# Patient Record
Sex: MALE | Race: WHITE | HISPANIC OR LATINO | Employment: OTHER | URBAN - METROPOLITAN AREA
[De-identification: names, ages, dates, MRNs, and addresses within clinical notes are randomized per-mention and may not be internally consistent; named-entity substitution may affect disease eponyms.]

---

## 2017-03-02 ENCOUNTER — TELEPHONE (OUTPATIENT)
Dept: CARDIOLOGY | Facility: CLINIC | Age: 77
End: 2017-03-02

## 2017-03-03 DIAGNOSIS — R00.2 PALPITATIONS: Primary | ICD-10-CM

## 2017-03-07 ENCOUNTER — TELEPHONE (OUTPATIENT)
Dept: CARDIOLOGY | Facility: CLINIC | Age: 77
End: 2017-03-07

## 2017-03-07 NOTE — TELEPHONE ENCOUNTER
Patient called asking when he was supposed to get holter monitor. Informed patient he could come in on Thursday this week after 11am or anytime Friday. Patient verbalized understanding.

## 2017-03-09 ENCOUNTER — OFFICE VISIT (OUTPATIENT)
Dept: CARDIOLOGY | Facility: CLINIC | Age: 77
End: 2017-03-09

## 2017-03-09 DIAGNOSIS — R00.2 PALPITATIONS: ICD-10-CM

## 2017-03-09 PROCEDURE — 93225 XTRNL ECG REC<48 HRS REC: CPT | Performed by: INTERNAL MEDICINE

## 2017-03-17 ENCOUNTER — OFFICE VISIT (OUTPATIENT)
Dept: CARDIOLOGY | Facility: CLINIC | Age: 77
End: 2017-03-17

## 2017-03-17 DIAGNOSIS — I48.0 PAROXYSMAL ATRIAL FIBRILLATION (HCC): ICD-10-CM

## 2017-03-17 PROCEDURE — 93227 XTRNL ECG REC<48 HR R&I: CPT | Performed by: INTERNAL MEDICINE

## 2017-03-21 ENCOUNTER — TELEPHONE (OUTPATIENT)
Dept: CARDIOLOGY | Facility: CLINIC | Age: 77
End: 2017-03-21

## 2017-06-02 RX ORDER — FLECAINIDE ACETATE 50 MG/1
TABLET ORAL
Qty: 60 TABLET | Refills: 1 | Status: SHIPPED | OUTPATIENT
Start: 2017-06-02 | End: 2017-11-28 | Stop reason: SDUPTHER

## 2017-09-01 ENCOUNTER — OFFICE VISIT (OUTPATIENT)
Dept: CARDIOLOGY | Facility: CLINIC | Age: 77
End: 2017-09-01

## 2017-09-01 VITALS
WEIGHT: 171.2 LBS | HEART RATE: 56 BPM | HEIGHT: 69 IN | BODY MASS INDEX: 25.36 KG/M2 | DIASTOLIC BLOOD PRESSURE: 82 MMHG | SYSTOLIC BLOOD PRESSURE: 124 MMHG

## 2017-09-01 DIAGNOSIS — E78.2 MIXED HYPERLIPIDEMIA: ICD-10-CM

## 2017-09-01 DIAGNOSIS — R00.2 PALPITATIONS: Primary | ICD-10-CM

## 2017-09-01 PROCEDURE — 99213 OFFICE O/P EST LOW 20 MIN: CPT | Performed by: INTERNAL MEDICINE

## 2017-09-01 RX ORDER — LANOLIN ALCOHOL/MO/W.PET/CERES
1000 CREAM (GRAM) TOPICAL DAILY
COMMUNITY

## 2017-09-01 NOTE — PROGRESS NOTES
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    Chief Complaint   Patient presents with   • Atrial Fibrillation     follow up       PROBLEM LIST:   1.  Paroxysmal atrial fibrillation with no recent evaluation:  a. 9/ 2011, Holter with 600 PACs, with 20 runs, the longest 16 beats.  Minimum heart rate of 41, maximum of 114.  b. 3/17 holter 1400 pac, nsat 11 beats  2.  Plus/minus hypertension.  3. Dyslipidemia:  a. November 2010:  Total  cholesterol 110, triglycerides 50, HDL 35, and LDL 65.  4. Glaucoma.    Allergies  Allergies   Allergen Reactions   • Sulfa Antibiotics        Current Medications    Current Outpatient Prescriptions:   •  aspirin 81 MG EC tablet, Take 81 mg by mouth daily., Disp: , Rfl:   •  diclofenac (VOLTAREN) 75 MG EC tablet, 75 mg as needed., Disp: , Rfl:   •  dorzolamide-timolol (COSOPT) 22.3-6.8 MG/ML ophthalmic solution, Administer 1 drop to both eyes., Disp: , Rfl:   •  flecainide (TAMBOCOR) 50 MG tablet, TAKE ONE TABLET BY MOUTH TWICE A DAY, Disp: 60 tablet, Rfl: 1  •  folic acid (FOLVITE) 1 MG tablet, 1 mg daily., Disp: , Rfl:   •  levothyroxine (SYNTHROID, LEVOTHROID) 50 MCG tablet, 50 mcg daily., Disp: , Rfl:   •  omega-3 acid ethyl esters (LOVAZA) 1 G capsule, Take 2 g by mouth 2 (two) times a day., Disp: , Rfl:   •  vitamin B-12 (CYANOCOBALAMIN) 1000 MCG tablet, Take 1,000 mcg by mouth Daily., Disp: , Rfl:     History of Present Illness     Pt   ROS:  All systems have been reviewed and are negative with the exception of those mentioned in the HPI.    OBJECTIVE:  Vitals:    09/01/17 0842   BP: 124/82   BP Location: Left arm   Patient Position: Sitting   Pulse: 56   Weight: 171 lb 3.2 oz (77.7 kg)   Height: 69\" (175.3 cm)     Physical Exam   Constitutional: He appears well-developed and well-nourished.   Neck: " Normal range of motion. Neck supple. No hepatojugular reflux and no JVD present. Carotid bruit is not present. No tracheal deviation present. No thyromegaly present.   Cardiovascular: Normal rate, regular rhythm, S1 normal, S2 normal, intact distal pulses and normal pulses.  PMI is not displaced.  Exam reveals no gallop, no distant heart sounds, no friction rub, no midsystolic click and no opening snap.    No murmur heard.  Pulses:       Radial pulses are 2+ on the right side, and 2+ on the left side.        Dorsalis pedis pulses are 2+ on the right side, and 2+ on the left side.        Posterior tibial pulses are 2+ on the right side, and 2+ on the left side.   Pulmonary/Chest: Effort normal and breath sounds normal. He has no wheezes. He has no rales.   Abdominal: Soft. Bowel sounds are normal. He exhibits no mass. There is no tenderness. There is no guarding.       Diagnostic Data:  Procedures      ASSESSMENT:   Diagnosis Plan   1. Palpitations     2. Mixed hyperlipidemia         PLAN:  Palpitations PACs  with acceptable QRS on flecainide we'll continue current medical regimen.    Dyslipidemia off statin therapy patient will have reevaluated with recalculation of his 10 year risk within the next one year.    I will see him back in one year sooner if needed.    Kai Manning MD, thank you for referring Mr. Hook for evaluation.  I have forwarded my electronically generated recommendations to you for review.  Please do not hesitate to call with any questions.      Rudy Dubois MD, [facility]

## 2017-11-28 RX ORDER — FLECAINIDE ACETATE 50 MG/1
TABLET ORAL
Qty: 60 TABLET | Refills: 5 | Status: SHIPPED | OUTPATIENT
Start: 2017-11-28 | End: 2018-06-01 | Stop reason: SDUPTHER

## 2018-03-19 ENCOUNTER — OFFICE VISIT (OUTPATIENT)
Dept: GASTROENTEROLOGY | Facility: CLINIC | Age: 78
End: 2018-03-19

## 2018-03-19 VITALS
OXYGEN SATURATION: 98 % | TEMPERATURE: 96.8 F | DIASTOLIC BLOOD PRESSURE: 92 MMHG | BODY MASS INDEX: 26.01 KG/M2 | SYSTOLIC BLOOD PRESSURE: 140 MMHG | HEIGHT: 69 IN | WEIGHT: 175.6 LBS | HEART RATE: 63 BPM

## 2018-03-19 DIAGNOSIS — R19.5 OCCULT BLOOD POSITIVE STOOL: Primary | ICD-10-CM

## 2018-03-19 PROCEDURE — 99204 OFFICE O/P NEW MOD 45 MIN: CPT | Performed by: INTERNAL MEDICINE

## 2018-03-19 NOTE — PROGRESS NOTES
PCP: Kai Manning MD    Chief Complaint   Patient presents with   • Black or Bloody Stool       History of Present Illness:   HPI  Mr. Hook is a 78-year-old with a history of hypertension, paroxysmal atrial fibrillation and hyperlipidemia.  He is doing well at this time without any complaints.  The patient had his annual physical and was found to have a positive occult blood.  He denies any visible blood in the stool.  The patient has regular bowel habits without any difficulty with evacuation.  There is no history of change in the caliber of stool.  Mr. Hook denies any abdominal pain with meals or postprandially.  There is no history of nausea or vomiting.  He denies any difficult or painful swallowing.  The patient denies any breakthrough heartburn.  Mr. Hook has a good appetite.  He denies any night sweats, fever or chills.  The patient had a colonoscopy about 7 years ago by Dr. Hayes which was unremarkable except for hemorrhoids.  Past Medical History:   Diagnosis Date   • Dyslipidemia    • Glaucoma    • Hypertension    • Paroxysmal atrial fibrillation        Past Surgical History:   Procedure Laterality Date   • COLONOSCOPY      10 yr ago         Current Outpatient Prescriptions:   •  aspirin 81 MG EC tablet, Take 81 mg by mouth daily., Disp: , Rfl:   •  diclofenac (VOLTAREN) 75 MG EC tablet, 75 mg as needed., Disp: , Rfl:   •  dorzolamide-timolol (COSOPT) 22.3-6.8 MG/ML ophthalmic solution, Administer 1 drop to both eyes., Disp: , Rfl:   •  flecainide (TAMBOCOR) 50 MG tablet, TAKE ONE TABLET BY MOUTH TWICE A DAY, Disp: 60 tablet, Rfl: 5  •  folic acid (FOLVITE) 1 MG tablet, 1 mg daily., Disp: , Rfl:   •  levothyroxine (SYNTHROID, LEVOTHROID) 50 MCG tablet, 88 mcg Daily., Disp: , Rfl:   •  omega-3 acid ethyl esters (LOVAZA) 1 G capsule, Take 2 g by mouth 2 (two) times a day., Disp: , Rfl:   •  vitamin B-12 (CYANOCOBALAMIN) 1000 MCG tablet, Take 1,000 mcg by mouth Every Other Day., Disp: , Rfl:      Allergies   Allergen Reactions   • Sulfa Antibiotics        Family History   Problem Relation Age of Onset   • Family history unknown: Yes       Social History     Social History   • Marital status:      Spouse name: N/A   • Number of children: N/A   • Years of education: N/A     Occupational History   • Not on file.     Social History Main Topics   • Smoking status: Never Smoker   • Smokeless tobacco: Not on file   • Alcohol use 0.6 - 1.2 oz/week     1 - 2 Glasses of wine per week      Comment: occas   • Drug use: No   • Sexual activity: Defer     Other Topics Concern   • Not on file     Social History Narrative   • No narrative on file       Review of Systems   Constitutional: Negative for activity change, appetite change, fatigue, fever and unexpected weight change.   HENT: Negative for dental problem, hearing loss, mouth sores, postnasal drip, sneezing, trouble swallowing and voice change.    Eyes: Negative for pain, redness, itching and visual disturbance.   Respiratory: Negative for cough, choking, chest tightness, shortness of breath and wheezing.    Cardiovascular: Negative for chest pain, palpitations and leg swelling.   Gastrointestinal: Positive for blood in stool. Negative for abdominal distention, abdominal pain, anal bleeding, constipation, diarrhea, nausea, rectal pain and vomiting.   Endocrine: Negative for cold intolerance, heat intolerance, polydipsia, polyphagia and polyuria.   Genitourinary: Negative.  Negative for dysuria, enuresis, flank pain, hematuria and urgency.   Musculoskeletal: Negative for arthralgias, back pain, gait problem, joint swelling and myalgias.   Skin: Negative for color change, pallor and rash.   Allergic/Immunologic: Negative for environmental allergies, food allergies and immunocompromised state.   Neurological: Negative for dizziness, tremors, seizures, facial asymmetry, speech difficulty, numbness and headaches.   Hematological: Negative for adenopathy.    Psychiatric/Behavioral: Negative for behavioral problems, confusion, dysphoric mood, hallucinations and self-injury.       Vitals:    03/19/18 1250   BP: 140/92   Pulse: 63   Temp: 96.8 °F (36 °C)   SpO2: 98%       Physical Exam   Constitutional: He is oriented to person, place, and time. He appears well-nourished. No distress.   HENT:   Head: Normocephalic and atraumatic.   Mouth/Throat: Oropharynx is clear and moist. No oropharyngeal exudate.   Eyes: Conjunctivae and EOM are normal. Pupils are equal, round, and reactive to light. No scleral icterus.   Neck: Normal range of motion. Neck supple. No thyromegaly present.   Cardiovascular: Normal rate, regular rhythm and normal heart sounds.  Exam reveals no gallop.    No murmur heard.  Pulmonary/Chest: Effort normal and breath sounds normal. He has no wheezes. He has no rales.   Abdominal: Soft. Bowel sounds are normal. There is no tenderness. There is no rebound and no guarding.   Musculoskeletal: Normal range of motion. He exhibits no edema or tenderness.   Lymphadenopathy:     He has no cervical adenopathy.   Neurological: He is alert and oriented to person, place, and time. He has normal reflexes. He exhibits normal muscle tone.   Skin: Skin is dry. No erythema. No pallor.   Psychiatric: He has a normal mood and affect. His behavior is normal. Thought content normal.   Vitals reviewed.      Adis was seen today for black or bloody stool.    Diagnoses and all orders for this visit:    Occult blood positive stool  -     Colonoscopy; Future    The differential diagnosis includes anorectal bleeding from hemorrhoids, colon polyps and colorectal cancer. The history is not suggestive of inflammatory bowel disease.  Also consider nonsteroidal induced colitis.      Plan: Will proceed with a colonoscopy.  I discussed the risks and benefits and the patient agrees to proceed.

## 2018-03-26 ENCOUNTER — LAB REQUISITION (OUTPATIENT)
Dept: LAB | Facility: HOSPITAL | Age: 78
End: 2018-03-26

## 2018-03-26 ENCOUNTER — OUTSIDE FACILITY SERVICE (OUTPATIENT)
Dept: GASTROENTEROLOGY | Facility: CLINIC | Age: 78
End: 2018-03-26

## 2018-03-26 DIAGNOSIS — R19.5 OTHER FECAL ABNORMALITIES: ICD-10-CM

## 2018-03-26 PROCEDURE — 88305 TISSUE EXAM BY PATHOLOGIST: CPT | Performed by: INTERNAL MEDICINE

## 2018-03-26 PROCEDURE — 45380 COLONOSCOPY AND BIOPSY: CPT | Performed by: INTERNAL MEDICINE

## 2018-03-26 PROCEDURE — 45385 COLONOSCOPY W/LESION REMOVAL: CPT | Performed by: INTERNAL MEDICINE

## 2018-03-27 LAB
CYTO UR: NORMAL
LAB AP CASE REPORT: NORMAL
LAB AP CLINICAL INFORMATION: NORMAL
Lab: NORMAL
PATH REPORT.FINAL DX SPEC: NORMAL
PATH REPORT.GROSS SPEC: NORMAL

## 2018-03-28 ENCOUNTER — TELEPHONE (OUTPATIENT)
Dept: GASTROENTEROLOGY | Facility: CLINIC | Age: 78
End: 2018-03-28

## 2018-03-30 ENCOUNTER — TELEPHONE (OUTPATIENT)
Dept: GASTROENTEROLOGY | Facility: CLINIC | Age: 78
End: 2018-03-30

## 2018-03-30 NOTE — TELEPHONE ENCOUNTER
Dr Mayes,  I talked to patient this morning. He stated that he had a Colonoscopy on Monday. Stool is small marble size bowel movements daily. No blood. He not feeling backed up or anything. Patient wants to know when will he start having normal bowel movements again. Please advise.

## 2018-03-30 NOTE — TELEPHONE ENCOUNTER
Terry Mayes MD   You 2 hours ago (12:54 PM)      The patient should have more normal bowel movements in a couple of days.  This is not uncommon after a bowel preparation and not having solid food prior to the procedure.   Thanks,   MARY (Routing comment)

## 2018-04-09 ENCOUNTER — TELEPHONE (OUTPATIENT)
Dept: GASTROENTEROLOGY | Facility: CLINIC | Age: 78
End: 2018-04-09

## 2018-04-09 NOTE — TELEPHONE ENCOUNTER
----- Message from Terry Mayes MD sent at 3/28/2018  3:58 PM EDT -----  Let Mr. [place] know there were 2 adenoma type polyps. He should have a repeat exam in 5 years.  Thanks,  GMW

## 2018-06-04 RX ORDER — FLECAINIDE ACETATE 50 MG/1
TABLET ORAL
Qty: 60 TABLET | Refills: 4 | Status: SHIPPED | OUTPATIENT
Start: 2018-06-04 | End: 2018-10-30 | Stop reason: SDUPTHER

## 2018-09-07 ENCOUNTER — OFFICE VISIT (OUTPATIENT)
Dept: CARDIOLOGY | Facility: CLINIC | Age: 78
End: 2018-09-07

## 2018-09-07 VITALS
HEART RATE: 56 BPM | BODY MASS INDEX: 25.62 KG/M2 | HEIGHT: 69 IN | OXYGEN SATURATION: 96 % | WEIGHT: 173 LBS | SYSTOLIC BLOOD PRESSURE: 152 MMHG | DIASTOLIC BLOOD PRESSURE: 90 MMHG

## 2018-09-07 DIAGNOSIS — E78.2 MIXED HYPERLIPIDEMIA: ICD-10-CM

## 2018-09-07 DIAGNOSIS — R00.2 PALPITATIONS: Primary | ICD-10-CM

## 2018-09-07 PROCEDURE — 93000 ELECTROCARDIOGRAM COMPLETE: CPT | Performed by: INTERNAL MEDICINE

## 2018-09-07 PROCEDURE — 99213 OFFICE O/P EST LOW 20 MIN: CPT | Performed by: INTERNAL MEDICINE

## 2018-09-07 RX ORDER — LEVOTHYROXINE SODIUM 88 UG/1
88 TABLET ORAL DAILY
COMMUNITY
Start: 2018-08-08 | End: 2022-10-06 | Stop reason: ALTCHOICE

## 2018-09-07 NOTE — PROGRESS NOTES
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    Chief Complaint   Patient presents with   • Atrial Fibrillation       PROBLEM LIST:   1.  Paroxysmal atrial fibrillation with no recent evaluation:  a. 9/ 2011, Holter with 600 PACs, with 20 runs, the longest 16 beats.  Minimum heart rate of 41, maximum of 114.  b. 3/17 holter 1400 pac, nsat 11 beats  2.  Plus/minus hypertension.  3. Dyslipidemia:  a. November 2010:  Total  cholesterol 110, triglycerides 50, HDL 35, and LDL 65.  4. Glaucoma.    Allergies  Allergies   Allergen Reactions   • Sulfa Antibiotics        Current Medications    Current Outpatient Prescriptions:   •  aspirin 81 MG EC tablet, Take 81 mg by mouth daily., Disp: , Rfl:   •  diclofenac (VOLTAREN) 75 MG EC tablet, Take 75 mg by mouth As Needed., Disp: , Rfl:   •  dorzolamide-timolol (COSOPT) 22.3-6.8 MG/ML ophthalmic solution, Administer 1 drop to both eyes., Disp: , Rfl:   •  flecainide (TAMBOCOR) 50 MG tablet, TAKE ONE TABLET BY MOUTH TWICE A DAY, Disp: 60 tablet, Rfl: 4  •  folic acid (FOLVITE) 1 MG tablet, Take 1 mg by mouth Daily., Disp: , Rfl:   •  GLUCOSAMINE-CHONDROITIN PO, Take 2 tablets by mouth Daily., Disp: , Rfl:   •  levothyroxine (SYNTHROID, LEVOTHROID) 88 MCG tablet, Take 88 mcg by mouth Daily., Disp: , Rfl:   •  vitamin B-12 (CYANOCOBALAMIN) 1000 MCG tablet, Take 1,000 mcg by mouth Every Other Day., Disp: , Rfl:     History of Present Illness     Pt in routine follow-up states she has felt well.  He continues to exercise in the form of dancing on a regular basis without limitation.  He brings his blood pressure diary with him which would be on average less than 130/80.  He has been tolerant of his medications.  He notes no overt sustained fluttering and has recent lab work with   "Kerri.  ROS:  All systems have been reviewed and are negative with the exception of those mentioned in the HPI.    OBJECTIVE:  Vitals:    09/07/18 0846   BP: 152/90   BP Location: Left arm   Patient Position: Sitting   Pulse: 56   SpO2: 96%   Weight: 78.5 kg (173 lb)   Height: 175.3 cm (69\")     Physical Exam   Constitutional: He appears well-developed and well-nourished.   Neck: Normal range of motion. Neck supple. No hepatojugular reflux and no JVD present. Carotid bruit is not present. No tracheal deviation present. No thyromegaly present.   Cardiovascular: Normal rate, regular rhythm, S1 normal, S2 normal, intact distal pulses and normal pulses.  PMI is not displaced.  Exam reveals no gallop, no distant heart sounds, no friction rub, no midsystolic click and no opening snap.    No murmur heard.  Pulses:       Radial pulses are 2+ on the right side, and 2+ on the left side.        Dorsalis pedis pulses are 2+ on the right side, and 2+ on the left side.        Posterior tibial pulses are 2+ on the right side, and 2+ on the left side.   Pulmonary/Chest: Effort normal and breath sounds normal. He has no wheezes. He has no rales.   Abdominal: Soft. Bowel sounds are normal. He exhibits no mass. There is no tenderness. There is no guarding.       Diagnostic Data:    ECG 12 Lead  Date/Time: 9/21/2018 1:40 PM  Performed by: SIVAKUMAR ZAYAS  Authorized by: SIVAKUMAR ZAYAS   Comparison: compared with previous ECG from 9/1/2017  Rhythm: sinus bradycardia  Rate: bradycardic  BPM: 54  Conduction: 1st degree  Clinical impression: abnormal ECG              ASSESSMENT:   Diagnosis Plan   1. Palpitations     2. Mixed hyperlipidemia         PLAN:  Palpitations PACs  with acceptable QRS on flecainide we'll continue current medical regimen.    Dyslipidemia off statin therapy patient will have reevaluated with recalculation of his 10 year risk within the next one year.    I will see him back in one year sooner if " Kai Sanabria MD, thank you for referring Mr. Miladys for evaluation.  I have forwarded my electronically generated recommendations to you for review.  Please do not hesitate to call with any questions.    Scribed for Rudy Dubois MD by Sveta Edwards PA-C. 9/7/2018  9:05 AM  I, Rudy Dubois MD, personally performed the services described in this documentation as scribed by the above named individual in my presence, and it is both accurate and complete.  9/7/2018  9:05 AM    Rudy Dubois MD, [facility]

## 2018-10-30 RX ORDER — FLECAINIDE ACETATE 50 MG/1
TABLET ORAL
Qty: 60 TABLET | Refills: 3 | Status: SHIPPED | OUTPATIENT
Start: 2018-10-30 | End: 2019-02-26 | Stop reason: SDUPTHER

## 2019-02-27 RX ORDER — FLECAINIDE ACETATE 50 MG/1
TABLET ORAL
Qty: 60 TABLET | Refills: 6 | Status: SHIPPED | OUTPATIENT
Start: 2019-02-27 | End: 2019-09-25 | Stop reason: SDUPTHER

## 2019-07-22 ENCOUNTER — TRANSCRIBE ORDERS (OUTPATIENT)
Dept: ADMINISTRATIVE | Facility: HOSPITAL | Age: 79
End: 2019-07-22

## 2019-07-22 ENCOUNTER — HOSPITAL ENCOUNTER (OUTPATIENT)
Dept: MRI IMAGING | Facility: HOSPITAL | Age: 79
Discharge: HOME | End: 2019-07-22
Admitting: FAMILY MEDICINE

## 2019-07-22 DIAGNOSIS — R42 DIZZINESS AND GIDDINESS: Primary | ICD-10-CM

## 2019-07-22 DIAGNOSIS — R42 DIZZINESS AND GIDDINESS: ICD-10-CM

## 2019-07-22 PROCEDURE — 82565 ASSAY OF CREATININE: CPT

## 2019-07-22 PROCEDURE — A9577 INJ MULTIHANCE: HCPCS | Performed by: FAMILY MEDICINE

## 2019-07-22 PROCEDURE — 70553 MRI BRAIN STEM W/O & W/DYE: CPT

## 2019-07-22 PROCEDURE — 0 GADOBENATE DIMEGLUMINE 529 MG/ML SOLUTION: Performed by: FAMILY MEDICINE

## 2019-07-22 RX ADMIN — GADOBENATE DIMEGLUMINE 10 ML: 529 INJECTION, SOLUTION INTRAVENOUS at 15:02

## 2019-07-23 LAB
CREAT BLDA-MCNC: 0.6 MG/DL (ref 0.6–1.3)
CREAT BLDA-MCNC: 1.3 MG/DL (ref 0.6–1.3)

## 2019-09-13 ENCOUNTER — OFFICE VISIT (OUTPATIENT)
Dept: CARDIOLOGY | Facility: CLINIC | Age: 79
End: 2019-09-13

## 2019-09-13 VITALS
WEIGHT: 177 LBS | BODY MASS INDEX: 26.22 KG/M2 | DIASTOLIC BLOOD PRESSURE: 80 MMHG | HEIGHT: 69 IN | OXYGEN SATURATION: 96 % | HEART RATE: 55 BPM | SYSTOLIC BLOOD PRESSURE: 144 MMHG

## 2019-09-13 DIAGNOSIS — E78.2 MIXED HYPERLIPIDEMIA: ICD-10-CM

## 2019-09-13 DIAGNOSIS — R00.2 PALPITATIONS: Primary | ICD-10-CM

## 2019-09-13 PROCEDURE — 93000 ELECTROCARDIOGRAM COMPLETE: CPT | Performed by: INTERNAL MEDICINE

## 2019-09-13 PROCEDURE — 99213 OFFICE O/P EST LOW 20 MIN: CPT | Performed by: INTERNAL MEDICINE

## 2019-09-13 NOTE — PROGRESS NOTES
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    Chief Complaint   Patient presents with   • Atrial Fibrillation       PROBLEM LIST:   1.  Paroxysmal atrial fibrillation with no recent evaluation:  a. 9/ 2011, Holter with 600 PACs, with 20 runs, the longest 16 beats.  Minimum heart rate of 41, maximum of 114.  b. 3/17 holter 1400 pac, nsat 11 beats  2.  hypertension.  3. Dyslipidemia:  a. November 2010:  Total  cholesterol 110, triglycerides 50, HDL 35, and LDL 65.  4. Glaucoma.    Allergies  Allergies   Allergen Reactions   • Sulfa Antibiotics        Current Medications    Current Outpatient Medications:   •  aspirin 81 MG EC tablet, Take 81 mg by mouth daily., Disp: , Rfl:   •  diclofenac (VOLTAREN) 75 MG EC tablet, Take 75 mg by mouth As Needed., Disp: , Rfl:   •  dorzolamide-timolol (COSOPT) 22.3-6.8 MG/ML ophthalmic solution, Administer 1 drop to both eyes., Disp: , Rfl:   •  flecainide (TAMBOCOR) 50 MG tablet, TAKE ONE TABLET BY MOUTH TWICE A DAY, Disp: 60 tablet, Rfl: 6  •  folic acid (FOLVITE) 1 MG tablet, Take 1 mg by mouth Daily., Disp: , Rfl:   •  GLUCOSAMINE-CHONDROITIN PO, Take 2 tablets by mouth Daily., Disp: , Rfl:   •  levothyroxine (SYNTHROID, LEVOTHROID) 88 MCG tablet, Take 88 mcg by mouth Daily., Disp: , Rfl:   •  vitamin B-12 (CYANOCOBALAMIN) 1000 MCG tablet, Take 1,000 mcg by mouth Every Other Day., Disp: , Rfl:     History of Present Illness     Pt in routine follow-up .  He continues to be active with no exercise-induced symptoms.  He notes less frequent palpitations since he has been on flecainide.  He is not traveling.    OBJECTIVE:  Vitals:    09/13/19 0902   BP: 144/80   BP Location: Left arm   Patient Position: Sitting   Pulse: 55   SpO2: 96%   Weight: 80.3 kg (177 lb)   Height: 175.3 cm  "(69\")     Physical Exam   Constitutional: He appears well-developed and well-nourished.   Neck: Normal range of motion. Neck supple. No hepatojugular reflux and no JVD present. Carotid bruit is not present. No tracheal deviation present. No thyromegaly present.   Cardiovascular: Normal rate, regular rhythm, S1 normal, S2 normal, intact distal pulses and normal pulses. PMI is not displaced. Exam reveals no gallop, no distant heart sounds, no friction rub, no midsystolic click and no opening snap.   No murmur heard.  Pulses:       Radial pulses are 2+ on the right side, and 2+ on the left side.        Dorsalis pedis pulses are 2+ on the right side, and 2+ on the left side.        Posterior tibial pulses are 2+ on the right side, and 2+ on the left side.   Pulmonary/Chest: Effort normal and breath sounds normal. He has no wheezes. He has no rales.   Abdominal: Soft. Bowel sounds are normal. He exhibits no mass. There is no tenderness. There is no guarding.       Diagnostic Data:    ECG 12 Lead  Date/Time: 9/13/2019 9:29 AM  Performed by: Rudy Dubois MD  Authorized by: Rudy Dubois MD   Comparison: compared with previous ECG from 9/7/2018  Similar to previous ECG  Rhythm: sinus bradycardia  BPM: 53    Clinical impression: non-specific ECG              ASSESSMENT:   Diagnosis Plan   1. Palpitations     2. Mixed hyperlipidemia         PLAN:  Palpitations PACs  with acceptable QRS on flecainide we'll continue current medical regimen.    Dyslipidemia off statin therapy    I will see him back in one year sooner if needed.    Kai Manning MD, thank you for referring Mr. Hook for evaluation.  I have forwarded my electronically generated recommendations to you for review.  Please do not hesitate to call with any questions.    Scribed for Rudy Dubois MD by Sveta Edwards PA-C. 9/13/2019  10:55 AM  I, Rudy Dubois MD, personally performed the services described in this documentation as scribed by the " above named individual in my presence, and it is both accurate and complete.  9/13/2019  10:55 AM    Rudy Dubois MD, FACC

## 2019-09-25 RX ORDER — FLECAINIDE ACETATE 50 MG/1
TABLET ORAL
Qty: 60 TABLET | Refills: 5 | Status: SHIPPED | OUTPATIENT
Start: 2019-09-25 | End: 2020-03-27

## 2020-02-19 ENCOUNTER — TELEPHONE (OUTPATIENT)
Dept: CARDIOLOGY | Facility: CLINIC | Age: 80
End: 2020-02-19

## 2020-02-19 NOTE — TELEPHONE ENCOUNTER
Patient called to state that he had been feeling his pulse in his wrist and that the frequency of missed beats has increased but he doesn't know how long its been doing that for.  Pt denies any chest pain or pressure or SOA. Pt states his BP has been running 120-140 systolic with diastolic being below 90 but he does not know what his heart rate has been running. Advised patient to record heart rate today and tmrw and then come to main office on 2/21/20 and an EKG can be done to check everything.

## 2020-02-21 ENCOUNTER — CLINICAL SUPPORT (OUTPATIENT)
Dept: CARDIOLOGY | Facility: CLINIC | Age: 80
End: 2020-02-21

## 2020-02-21 DIAGNOSIS — I48.0 PAROXYSMAL ATRIAL FIBRILLATION (HCC): Primary | ICD-10-CM

## 2020-02-21 PROCEDURE — 93000 ELECTROCARDIOGRAM COMPLETE: CPT | Performed by: INTERNAL MEDICINE

## 2020-02-21 NOTE — TELEPHONE ENCOUNTER
EKG reveals PAC's only. No afib. Pt reassured and will continue medications, keep scheduled follow up.

## 2020-03-27 RX ORDER — FLECAINIDE ACETATE 50 MG/1
TABLET ORAL
Qty: 180 TABLET | Refills: 2 | Status: SHIPPED | OUTPATIENT
Start: 2020-03-27 | End: 2020-12-28

## 2020-07-15 NOTE — TELEPHONE ENCOUNTER
Called patient back and left VM that his monitor results were not in system yet he can call back first of next week otherwise typically will receive a letter with results in mail.   Get the x-ray of the chest done today  Lab work done today  You have been referred to the smoke cessation program  Also alcohol treatment program  Sleep with the head end of the bed up  Do not eat before going to sleep  Regular exercises  Return to clinic earlier if any problems

## 2020-09-18 ENCOUNTER — OFFICE VISIT (OUTPATIENT)
Dept: CARDIOLOGY | Facility: CLINIC | Age: 80
End: 2020-09-18

## 2020-09-18 VITALS
OXYGEN SATURATION: 97 % | TEMPERATURE: 96.4 F | DIASTOLIC BLOOD PRESSURE: 82 MMHG | HEART RATE: 57 BPM | BODY MASS INDEX: 25.77 KG/M2 | HEIGHT: 69 IN | SYSTOLIC BLOOD PRESSURE: 140 MMHG | WEIGHT: 174 LBS

## 2020-09-18 DIAGNOSIS — R00.2 PALPITATIONS: Primary | ICD-10-CM

## 2020-09-18 PROCEDURE — 93000 ELECTROCARDIOGRAM COMPLETE: CPT | Performed by: INTERNAL MEDICINE

## 2020-09-18 PROCEDURE — 99212 OFFICE O/P EST SF 10 MIN: CPT | Performed by: INTERNAL MEDICINE

## 2020-09-18 NOTE — PROGRESS NOTES
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    PROBLEM LIST:   1.  Paroxysmal atrial fibrillation with no recent evaluation:  a. 9/ 2011, Holter with 600 PACs, with 20 runs, the longest 16 beats.  Minimum heart rate of 41, maximum of 114.  b. 3/17 holter 1400 pac, nsat 11 beats  2.  hypertension.  3. Dyslipidemia:  a. November 2010:  Total  cholesterol 110, triglycerides 50, HDL 35, and LDL 65.  4. Glaucoma.      CC:   Chief Complaint   Patient presents with   • Palpitations       Allergies  Allergies   Allergen Reactions   • Sulfa Antibiotics        Current Medications    Current Outpatient Medications:   •  aspirin 81 MG EC tablet, Take 81 mg by mouth daily., Disp: , Rfl:   •  diclofenac (VOLTAREN) 75 MG EC tablet, Take 75 mg by mouth As Needed., Disp: , Rfl:   •  dorzolamide-timolol (COSOPT) 22.3-6.8 MG/ML ophthalmic solution, Administer 1 drop into the left eye 2 (Two) Times a Day., Disp: , Rfl:   •  flecainide (TAMBOCOR) 50 MG tablet, TAKE ONE TABLET BY MOUTH TWICE A DAY, Disp: 180 tablet, Rfl: 2  •  folic acid (FOLVITE) 1 MG tablet, Take 1 mg by mouth Daily., Disp: , Rfl:   •  GLUCOSAMINE-CHONDROITIN PO, Take 2 tablets by mouth Daily., Disp: , Rfl:   •  levothyroxine (SYNTHROID, LEVOTHROID) 88 MCG tablet, Take 88 mcg by mouth Daily., Disp: , Rfl:   •  vitamin B-12 (CYANOCOBALAMIN) 1000 MCG tablet, Take 1,000 mcg by mouth Daily., Disp: , Rfl:       History of Present Illness   Adis Hook is a 80 y.o. year old male here for follow up.    Pt denies any chest pain, dyspnea, dyspnea on exertion, orthopnea, PND, palpitations, lower extremity edema, or claudication.  Not exercising as during the COVID pandemic is  "doing some activities at home.  No provocative symptoms      OBJECTIVE:  Vitals:    09/18/20 0932   BP: 140/82   BP Location: Left arm   Patient Position: Sitting   Cuff Size: Adult   Pulse: 57   Temp: 96.4 °F (35.8 °C)   SpO2: 97%   Weight: 78.9 kg (174 lb)   Height: 175.3 cm (69\")     Constitutional:       Appearance: Healthy appearance. Not in distress.   Neck:      Vascular: No JVR. JVD normal.   Pulmonary:      Effort: Pulmonary effort is normal.      Breath sounds: Normal breath sounds. No wheezing. No rhonchi. No rales.   Chest:      Chest wall: Not tender to palpatation.   Abdominal:      General: Bowel sounds are normal.      Palpations: Abdomen is soft.      Tenderness: There is no abdominal tenderness.   Musculoskeletal: Normal range of motion.         General: No tenderness.   Skin:     General: Skin is warm and dry.   Neurological:      General: No focal deficit present.      Mental Status: Alert and oriented to person, place and time.         Diagnostic Data:    ECG 12 Lead    Date/Time: 9/18/2020 9:42 AM  Performed by: Rudy Dubois MD  Authorized by: Rudy Dubois MD   Previous ECG: no previous ECG available  Rhythm: sinus rhythm  BPM: 57  Conduction: 1st degree AV block    Clinical impression: non-specific ECG              ASSESSMENT:   Diagnosis Plan   1. Palpitations         PLAN:  Palpitations acceptable tolerance and efficacy of flecainide at low-dose    Kai Manning MD, thank you for referring Mr. Hook for evaluation.  I have forwarded my electronically generated recommendations to you for review.  Please do not hesitate to call with any questions.      Rudy Dubois MD, FACC  "

## 2020-12-28 RX ORDER — FLECAINIDE ACETATE 50 MG/1
TABLET ORAL
Qty: 60 TABLET | Refills: 3 | Status: SHIPPED | OUTPATIENT
Start: 2020-12-28 | End: 2021-04-29 | Stop reason: SDUPTHER

## 2021-04-29 RX ORDER — FLECAINIDE ACETATE 50 MG/1
50 TABLET ORAL 2 TIMES DAILY
Qty: 180 TABLET | Refills: 3 | Status: SHIPPED | OUTPATIENT
Start: 2021-04-29 | End: 2022-04-19

## 2021-09-23 NOTE — PROGRESS NOTES
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    PROBLEM LIST:   1.  Paroxysmal atrial fibrillation with no recent evaluation:  a. 9/ 2011, Holter with 600 PACs, with 20 runs, the longest 16 beats.  Minimum heart rate of 41, maximum of 114.  b. 3/17 holter 1400 pac, nsat 11 beats  2.  hypertension  3. Dyslipidemia:  a. November 2010:  Total  cholesterol 110, triglycerides 50, HDL 35, and LDL 65.  4. Glaucoma      CC:   Chief Complaint   Patient presents with   • PAF   • Hypertension   • Dyslipidemia       Allergies  Allergies   Allergen Reactions   • Sulfa Antibiotics        Current Medications    Current Outpatient Medications:   •  aspirin 81 MG EC tablet, Take 81 mg by mouth daily., Disp: , Rfl:   •  diclofenac (VOLTAREN) 75 MG EC tablet, Take 75 mg by mouth As Needed., Disp: , Rfl:   •  dorzolamide-timolol (COSOPT) 22.3-6.8 MG/ML ophthalmic solution, Administer 1 drop into the left eye 2 (Two) Times a Day., Disp: , Rfl:   •  flecainide (TAMBOCOR) 50 MG tablet, Take 1 tablet by mouth 2 (Two) Times a Day., Disp: 180 tablet, Rfl: 3  •  folic acid (FOLVITE) 1 MG tablet, Take 1 mg by mouth Daily., Disp: , Rfl:   •  GLUCOSAMINE-CHONDROITIN PO, Take 2 tablets by mouth Daily., Disp: , Rfl:   •  levothyroxine (SYNTHROID, LEVOTHROID) 88 MCG tablet, Take 88 mcg by mouth Daily., Disp: , Rfl:   •  vitamin B-12 (CYANOCOBALAMIN) 1000 MCG tablet, Take 1,000 mcg by mouth Daily., Disp: , Rfl:   •  levothyroxine (SYNTHROID, LEVOTHROID) 100 MCG tablet, , Disp: , Rfl:   •  lisinopril (PRINIVIL,ZESTRIL) 10 MG tablet, 1/2 pill daily, Disp: , Rfl:   •  omeprazole (priLOSEC) 20 MG capsule, , Disp: , Rfl:   •  travoprost, BAK free, (TRAVATAN)  "0.004 % solution ophthalmic solution, , Disp: , Rfl:       History of Present Illness   Adis Hook is a 81 y.o. year old male here for follow up.  Today, the patient states that he is doing well from a cardiac standpoint.  He reports that ever since initiation of flecainide he has not been experiencing any signs/symptoms of his atrial fibrillation.  Notably, the patient reports that he presented to his PCP 2 to 3 months ago and was noted to have elevated blood pressures.  At that time, the patient was initiated on lisinopril 5 daily.  The patient reported that he monitors his blood pressures at home and records blood pressures that are correlated to blood pressures that we have read in clinic (152/80).  Notably, the patient reports that he is still physically active.  He has not been participating in his dancing exercises however he still performs various leg and upper body exercises.  He denies any issues with this physical exertion.    Pt denies any chest pain, dyspnea, dyspnea on exertion, orthopnea, PND, palpitations, lower extremity edema, or claudication.      OBJECTIVE:  Vitals:    09/24/21 0945   BP: 152/80   Pulse: 64   SpO2: 95%   Weight: 79.4 kg (175 lb)   Height: 175.3 cm (69.02\")     Body mass index is 25.83 kg/m².    Vitals and nursing note reviewed.   Constitutional:       General: Awake. Not in acute distress.     Appearance: Healthy appearance. Well-developed and not in distress.   Eyes:      General: No scleral icterus.     Conjunctiva/sclera: Conjunctivae normal.      Pupils: Pupils are equal, round, and reactive to light.   HENT:      Head: Normocephalic and atraumatic.      Nose: Nose normal.    Mouth/Throat:      Pharynx: Uvula midline.   Neck:      Thyroid: No thyromegaly.      Vascular: No carotid bruit.      Trachea: No tracheal deviation.      Lymphadenopathy: No cervical adenopathy.   Pulmonary:      Effort: Pulmonary effort is normal.      Breath sounds: Normal breath sounds. No " wheezing. No rhonchi. No rales.   Cardiovascular:      Normal rate. Regular rhythm. Normal S1. Normal S2.      Murmurs: There is no murmur.      No gallop. No click. No rub.   Pulses:     Intact distal pulses.   Edema:     Peripheral edema absent.   Abdominal:      General: Bowel sounds are normal.      Palpations: Abdomen is soft. There is no abdominal mass.      Tenderness: There is no abdominal tenderness. There is no guarding.   Musculoskeletal:         General: No tenderness.      Extremities: No clubbing present.     Cervical back: Normal range of motion and neck supple. Skin:     General: Skin is warm and dry. There is no cyanosis.      Findings: No erythema or rash.   Neurological:      Mental Status: Alert, oriented to person, place, and time and oriented to person, place and time.   Psychiatric:         Attention and Perception: Attention normal.         Mood and Affect: Mood normal.         Speech: Speech normal.         Behavior: Behavior normal.         Diagnostic Data:    ECG 12 Lead    Date/Time: 9/24/2021 10:35 AM  Performed by: Stu Hackett PA-C  Authorized by: Stu Hackett PA-C   Comparison: compared with previous ECG from 9/18/2020  Similar to previous ECG  Rhythm: sinus bradycardia  Rate: bradycardic  BPM: 59  Conduction: 1st degree AV block  T inversion: aVR and III  Other: no other findings    Clinical impression: abnormal EKG              ASSESSMENT:   Diagnosis Plan   1. Paroxysmal atrial fibrillation (CMS/HCC)  ECG 12 Lead   2. Essential hypertension  ECG 12 Lead   3. Dyslipidemia  ECG 12 Lead       PLAN:  1.  PAF-patient reports that he not experience any episodes of atrial fibrillation since initiation of flecainide.  EKG review today does not reveal any QRS elongation.  QRS is stable compared to last EKG in 2018.  2.  Hypertension-patient initiated on lisinopril 5 daily around 2 to 3 months ago.  Patient's blood pressure still appear to be slightly elevated.  Patient counseled to increase  lisinopril dose to 10 daily, monitor blood pressures for the next 1 to 2 weeks, and to call back to office with readings.  3.  Dyslipidemia-request lipid panel and other panels from PCP at this time.    Scribe: Stu Hackett PA-C for Dr. Rudy Dubois M.D. [facility]

## 2021-09-24 ENCOUNTER — OFFICE VISIT (OUTPATIENT)
Dept: CARDIOLOGY | Facility: CLINIC | Age: 81
End: 2021-09-24

## 2021-09-24 VITALS
WEIGHT: 175 LBS | HEIGHT: 69 IN | OXYGEN SATURATION: 95 % | DIASTOLIC BLOOD PRESSURE: 80 MMHG | BODY MASS INDEX: 25.92 KG/M2 | HEART RATE: 64 BPM | SYSTOLIC BLOOD PRESSURE: 152 MMHG

## 2021-09-24 DIAGNOSIS — I10 ESSENTIAL HYPERTENSION: ICD-10-CM

## 2021-09-24 DIAGNOSIS — E78.5 DYSLIPIDEMIA: ICD-10-CM

## 2021-09-24 DIAGNOSIS — I48.0 PAROXYSMAL ATRIAL FIBRILLATION (HCC): Primary | ICD-10-CM

## 2021-09-24 PROCEDURE — 93000 ELECTROCARDIOGRAM COMPLETE: CPT | Performed by: INTERNAL MEDICINE

## 2021-09-24 PROCEDURE — 99214 OFFICE O/P EST MOD 30 MIN: CPT | Performed by: INTERNAL MEDICINE

## 2021-09-24 RX ORDER — LEVOTHYROXINE SODIUM 0.1 MG/1
100 TABLET ORAL DAILY
COMMUNITY
Start: 2021-08-29

## 2021-09-24 RX ORDER — LISINOPRIL 10 MG/1
TABLET ORAL
COMMUNITY
Start: 2021-09-09 | End: 2022-10-06 | Stop reason: ALTCHOICE

## 2021-09-24 RX ORDER — OMEPRAZOLE 20 MG/1
20 CAPSULE, DELAYED RELEASE ORAL DAILY
COMMUNITY
Start: 2021-09-21

## 2021-09-24 RX ORDER — TRAVOPROST OPHTHALMIC SOLUTION 0.04 MG/ML
1 SOLUTION OPHTHALMIC NIGHTLY
COMMUNITY
Start: 2021-08-19

## 2022-04-19 RX ORDER — FLECAINIDE ACETATE 50 MG/1
TABLET ORAL
Qty: 60 TABLET | Refills: 0 | Status: SHIPPED | OUTPATIENT
Start: 2022-04-19 | End: 2022-05-20

## 2022-05-20 RX ORDER — FLECAINIDE ACETATE 50 MG/1
TABLET ORAL
Qty: 60 TABLET | Refills: 3 | Status: SHIPPED | OUTPATIENT
Start: 2022-05-20 | End: 2022-09-15

## 2022-09-15 RX ORDER — FLECAINIDE ACETATE 50 MG/1
TABLET ORAL
Qty: 60 TABLET | Refills: 3 | Status: SHIPPED | OUTPATIENT
Start: 2022-09-15 | End: 2023-01-16

## 2022-10-01 NOTE — PROGRESS NOTES
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    PROBLEM LIST:   1.  Paroxysmal atrial fibrillation:  a. 9/ 2011, Holter with 600 PACs, with 20 runs, the longest 16 beats.  Minimum heart rate of 41, maximum of 114.  b. 3/17 holter 1400 pac, nsat 11 beats  c. On flecainide  2. Hypertension  3. Dyslipidemia:  a. November 2010:  Total  cholesterol 110, triglycerides 50, HDL 35, and LDL 65.  4. Hypothyroidism  5. Homocystinemia  6. Glaucoma  7. Left Shoulder Pain    CC: f/u paroxysmal afib, htn    Allergies  Allergies   Allergen Reactions   • Sulfa Antibiotics        Current Medications    Current Outpatient Medications:   •  aspirin 81 MG EC tablet, Take 81 mg by mouth daily., Disp: , Rfl:   •  dorzolamide-timolol (COSOPT) 22.3-6.8 MG/ML ophthalmic solution, Administer 1 drop into the left eye 2 (Two) Times a Day., Disp: , Rfl:   •  flecainide (TAMBOCOR) 50 MG tablet, TAKE ONE TABLET BY MOUTH TWICE A DAY, Disp: 60 tablet, Rfl: 3  •  folic acid (FOLVITE) 1 MG tablet, Take 1 mg by mouth Daily., Disp: , Rfl:   •  GLUCOSAMINE-CHONDROITIN PO, Take 2 tablets by mouth Daily., Disp: , Rfl:   •  levothyroxine (SYNTHROID, LEVOTHROID) 100 MCG tablet, 100 mcg Daily., Disp: , Rfl:   •  omeprazole (priLOSEC) 20 MG capsule, 20 mg Daily., Disp: , Rfl:   •  travoprost, CONNOR free, (TRAVATAN) 0.004 % solution ophthalmic solution, Administer 1 drop into the left eye Every Night., Disp: , Rfl:   •  vitamin B-12 (CYANOCOBALAMIN) 1000 MCG tablet, Take 1,000 mcg by mouth Daily., Disp: , Rfl:       History of Present Illness   Adis Hook is a 82 y.o. year old male here for follow up.    Pt denies any chest pain, dyspnea, dyspnea  "on exertion, orthopnea, PND, palpitations, lower extremity edema, or claudication.  He is not exercising regularly during COVID.  He is begun a walking regimen now that he is retired this year.  He states he has had no issues with fluttering palpitations since initiation of flecainide.  He did discontinue his lisinopril most recently in accordance with his PCP recommendation as his blood pressure was getting low but a symptom    OBJECTIVE:  Vitals:    10/06/22 0917   BP: 138/78   BP Location: Right arm   Patient Position: Sitting   Cuff Size: Adult   Pulse: 53   SpO2: 97%   Weight: 76.2 kg (168 lb)   Height: 175.3 cm (69\")     Body mass index is 24.81 kg/m².    Constitutional:       Appearance: Healthy appearance. Not in distress.   Neck:      Vascular: No JVR. JVD normal.   Pulmonary:      Effort: Pulmonary effort is normal.      Breath sounds: Normal breath sounds. No wheezing. No rhonchi. No rales.   Chest:      Chest wall: Not tender to palpatation.   Cardiovascular:      PMI at left midclavicular line. Normal rate. Regular rhythm. Normal S1. Normal S2.      Murmurs: There is no murmur.      No gallop. No click. No rub.   Pulses:     Intact distal pulses.   Edema:     Peripheral edema absent.   Abdominal:      General: Bowel sounds are normal.      Palpations: Abdomen is soft.      Tenderness: There is no abdominal tenderness.   Musculoskeletal: Normal range of motion.         General: No tenderness. Skin:     General: Skin is warm and dry.   Neurological:      General: No focal deficit present.      Mental Status: Alert and oriented to person, place and time.         Diagnostic Data:    ECG 12 Lead    Date/Time: 10/6/2022 9:45 AM  Performed by: Rudy Dubois MD  Authorized by: Rudy Dubois MD   Previous ECG: no previous ECG available  Rhythm: sinus rhythm  BPM: 53  Conduction: 1st degree AV block    Clinical impression: normal ECG              ASSESSMENT:   Diagnosis Plan   1. Palpitations     2. Primary " hypertension         PLAN:  Palpitations controlled on flecainide with acceptable QRS    Hypertension not controlled off medication continue current regimen          Rudy Dubois MD, FACC

## 2022-10-06 ENCOUNTER — OFFICE VISIT (OUTPATIENT)
Dept: CARDIOLOGY | Facility: CLINIC | Age: 82
End: 2022-10-06

## 2022-10-06 VITALS
SYSTOLIC BLOOD PRESSURE: 138 MMHG | DIASTOLIC BLOOD PRESSURE: 78 MMHG | WEIGHT: 168 LBS | HEART RATE: 53 BPM | OXYGEN SATURATION: 97 % | BODY MASS INDEX: 24.88 KG/M2 | HEIGHT: 69 IN

## 2022-10-06 DIAGNOSIS — I10 PRIMARY HYPERTENSION: ICD-10-CM

## 2022-10-06 DIAGNOSIS — R00.2 PALPITATIONS: Primary | ICD-10-CM

## 2022-10-06 PROCEDURE — 99213 OFFICE O/P EST LOW 20 MIN: CPT | Performed by: INTERNAL MEDICINE

## 2022-10-06 PROCEDURE — 93000 ELECTROCARDIOGRAM COMPLETE: CPT | Performed by: INTERNAL MEDICINE

## 2023-01-16 RX ORDER — FLECAINIDE ACETATE 50 MG/1
TABLET ORAL
Qty: 60 TABLET | Refills: 3 | Status: SHIPPED | OUTPATIENT
Start: 2023-01-16

## 2023-05-15 RX ORDER — FLECAINIDE ACETATE 50 MG/1
TABLET ORAL
Qty: 180 TABLET | Refills: 1 | Status: SHIPPED | OUTPATIENT
Start: 2023-05-15

## 2023-07-28 ENCOUNTER — TRANSCRIBE ORDERS (OUTPATIENT)
Dept: GENERAL RADIOLOGY | Facility: HOSPITAL | Age: 83
End: 2023-07-28
Payer: MEDICARE

## 2023-07-28 ENCOUNTER — HOSPITAL ENCOUNTER (OUTPATIENT)
Dept: GENERAL RADIOLOGY | Facility: HOSPITAL | Age: 83
Discharge: HOME | End: 2023-07-28
Admitting: FAMILY MEDICINE
Payer: MEDICARE

## 2023-07-28 DIAGNOSIS — M25.571 PAIN, JOINT, FOOT, RIGHT: ICD-10-CM

## 2023-07-28 DIAGNOSIS — M25.571 PAIN, JOINT, FOOT, RIGHT: Primary | ICD-10-CM

## 2023-07-28 PROCEDURE — 73630 X-RAY EXAM OF FOOT: CPT

## 2023-10-11 NOTE — PROGRESS NOTES
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     PROBLEM LIST:   Atrial tachycardia/history of paroxysmal atrial fibrillation  9/ 2011, Holter with 600 PACs, with 20 runs, the longest 16 beats.  Minimum heart rate of 41, maximum of 114.  3/17 holter 1400 pac, nsat 11 beats  On flecainide  Hypertension  Dyslipidemia:  November 2010:  Total  cholesterol 110, triglycerides 50, HDL 35, and LDL 65.  8/22 192/151/32/130  Hypothyroidism  Homocystinemia  Glaucoma  Left Shoulder Pain      CC:   Chief Complaint   Patient presents with    Palpitations    Paroxysmal atrial fibrillation       Allergies  Allergies   Allergen Reactions    Sulfa Antibiotics        Current Medications    Current Outpatient Medications:     aspirin 81 MG EC tablet, Take 1 tablet by mouth Daily., Disp: , Rfl:     dorzolamide-timolol (COSOPT) 22.3-6.8 MG/ML ophthalmic solution, Administer 1 drop into the left eye 2 (Two) Times a Day., Disp: , Rfl:     flecainide (TAMBOCOR) 50 MG tablet, TAKE ONE TABLET BY MOUTH TWICE A DAY, Disp: 180 tablet, Rfl: 1    folic acid (FOLVITE) 1 MG tablet, Take 1 tablet by mouth Daily., Disp: , Rfl:     GLUCOSAMINE-CHONDROITIN PO, Take 2 tablets by mouth Daily., Disp: , Rfl:     levothyroxine (SYNTHROID, LEVOTHROID) 100 MCG tablet, 1 tablet Daily., Disp: , Rfl:     omeprazole (priLOSEC) 20 MG capsule, 1 capsule Daily., Disp: , Rfl:     travoprost, CONNOR free, (TRAVATAN) 0.004 % solution ophthalmic solution, Administer 1 drop into the left eye Every Night., Disp: , Rfl:     vitamin B-12 (CYANOCOBALAMIN) 1000 MCG tablet, Take 1 tablet by mouth Daily., Disp: , Rfl:       History of Present Illness   Adis Hook is a 83 y.o.  "year old male here for follow up on atrial tachycardia on flecainide, hypertension and dyslipidemia.  He unfortunately had a stress fracture in his foot and discontinued his mall walking for period of time.  He notes no recurrent palpitations.  He brings his blood pressure diary with him with appropriate control.  He had weaned and discontinued his antihypertensive via discussion with his primary care provider      OBJECTIVE:  Vitals:    10/13/23 0852   BP: 156/82   BP Location: Left arm   Patient Position: Sitting   Pulse: 62   SpO2: 96%   Weight: 80.2 kg (176 lb 12.8 oz)   Height: 175.3 cm (69\")     Body mass index is 26.11 kg/mý.    Constitutional:       Appearance: Healthy appearance. Not in distress.   Neck:      Vascular: No JVR. JVD normal.   Pulmonary:      Effort: Pulmonary effort is normal.      Breath sounds: Normal breath sounds. No wheezing. No rhonchi. No rales.   Chest:      Chest wall: Not tender to palpatation.   Cardiovascular:      PMI at left midclavicular line. Normal rate. Regular rhythm. Normal S1. Normal S2.       Murmurs: There is no murmur.      No gallop.  No click. No rub.   Pulses:     Intact distal pulses.   Edema:     Peripheral edema absent.   Abdominal:      General: Bowel sounds are normal.      Palpations: Abdomen is soft.      Tenderness: There is no abdominal tenderness.   Musculoskeletal: Normal range of motion.         General: No tenderness. Skin:     General: Skin is warm and dry.   Neurological:      General: No focal deficit present.      Mental Status: Alert and oriented to person, place and time.      Comments: Repetitive throat clearing         Diagnostic Data:    ECG 12 Lead    Date/Time: 10/13/2023 9:40 AM  Performed by: Rudy Dubois MD    Authorized by: Rudy Dubois MD  Comparison: compared with previous ECG from 10/6/2022  Similar to previous ECG  Rhythm: sinus rhythm  BPM: 61  Conduction: 1st degree AV block    Clinical impression: abnormal " EKG              ASSESSMENT:   Diagnosis Plan   1. Palpitations        2. Primary hypertension            PLAN:  Palpitations suppressed with flecainide with acceptable QRS    Hypertension now on observation only following exercise regimen initiation          Rudy Dubois MD, FACC

## 2023-10-13 ENCOUNTER — OFFICE VISIT (OUTPATIENT)
Dept: CARDIOLOGY | Facility: CLINIC | Age: 83
End: 2023-10-13
Payer: MEDICARE

## 2023-10-13 VITALS
SYSTOLIC BLOOD PRESSURE: 156 MMHG | HEIGHT: 69 IN | DIASTOLIC BLOOD PRESSURE: 82 MMHG | BODY MASS INDEX: 26.19 KG/M2 | HEART RATE: 62 BPM | WEIGHT: 176.8 LBS | OXYGEN SATURATION: 96 %

## 2023-10-13 DIAGNOSIS — R00.2 PALPITATIONS: Primary | ICD-10-CM

## 2023-10-13 DIAGNOSIS — I10 PRIMARY HYPERTENSION: ICD-10-CM

## 2023-10-13 PROCEDURE — 3079F DIAST BP 80-89 MM HG: CPT | Performed by: INTERNAL MEDICINE

## 2023-10-13 PROCEDURE — 93000 ELECTROCARDIOGRAM COMPLETE: CPT | Performed by: INTERNAL MEDICINE

## 2023-10-13 PROCEDURE — 3077F SYST BP >= 140 MM HG: CPT | Performed by: INTERNAL MEDICINE

## 2023-10-13 PROCEDURE — 99213 OFFICE O/P EST LOW 20 MIN: CPT | Performed by: INTERNAL MEDICINE

## 2023-11-08 RX ORDER — FLECAINIDE ACETATE 50 MG/1
TABLET ORAL
Qty: 180 TABLET | Refills: 1 | Status: SHIPPED | OUTPATIENT
Start: 2023-11-08

## 2024-05-02 RX ORDER — FLECAINIDE ACETATE 50 MG/1
50 TABLET ORAL 2 TIMES DAILY
Qty: 180 TABLET | Refills: 0 | Status: SHIPPED | OUTPATIENT
Start: 2024-05-02

## 2024-07-29 DIAGNOSIS — I48.0 PAROXYSMAL ATRIAL FIBRILLATION: Primary | ICD-10-CM

## 2024-07-29 RX ORDER — FLECAINIDE ACETATE 50 MG/1
50 TABLET ORAL 2 TIMES DAILY
Qty: 180 TABLET | Refills: 0 | Status: SHIPPED | OUTPATIENT
Start: 2024-07-29

## 2024-10-25 RX ORDER — FLECAINIDE ACETATE 50 MG/1
TABLET ORAL
Qty: 180 TABLET | Refills: 0 | Status: SHIPPED | OUTPATIENT
Start: 2024-10-25

## 2025-01-03 ENCOUNTER — TELEPHONE (OUTPATIENT)
Dept: CARDIOLOGY | Facility: CLINIC | Age: 85
End: 2025-01-03
Payer: MEDICARE

## 2025-01-03 RX ORDER — FLECAINIDE ACETATE 50 MG/1
50 TABLET ORAL 2 TIMES DAILY
Qty: 180 TABLET | Refills: 1 | Status: SHIPPED | OUTPATIENT
Start: 2025-01-03

## 2025-01-03 NOTE — TELEPHONE ENCOUNTER
Patient called for refills for flecainide. Patient states his insurance is out of network. Advised patient to have further refills sent to PCP. Patient verbalizes understanding.

## 2025-02-09 ENCOUNTER — APPOINTMENT (OUTPATIENT)
Dept: ULTRASOUND IMAGING | Facility: HOSPITAL | Age: 85
End: 2025-02-09
Payer: MEDICARE

## 2025-02-09 ENCOUNTER — HOSPITAL ENCOUNTER (EMERGENCY)
Facility: HOSPITAL | Age: 85
Discharge: HOME | End: 2025-02-09
Attending: EMERGENCY MEDICINE | Admitting: EMERGENCY MEDICINE
Payer: MEDICARE

## 2025-02-09 VITALS
DIASTOLIC BLOOD PRESSURE: 81 MMHG | HEIGHT: 69 IN | HEART RATE: 92 BPM | OXYGEN SATURATION: 96 % | TEMPERATURE: 97.9 F | RESPIRATION RATE: 16 BRPM | BODY MASS INDEX: 24.59 KG/M2 | WEIGHT: 166 LBS | SYSTOLIC BLOOD PRESSURE: 128 MMHG

## 2025-02-09 DIAGNOSIS — N43.3 LEFT HYDROCELE: ICD-10-CM

## 2025-02-09 DIAGNOSIS — N28.9 RENAL INSUFFICIENCY: ICD-10-CM

## 2025-02-09 DIAGNOSIS — N30.00 ACUTE CYSTITIS WITHOUT HEMATURIA: Primary | ICD-10-CM

## 2025-02-09 LAB
ALBUMIN SERPL-MCNC: 3.7 G/DL (ref 3.5–5.2)
ALBUMIN/GLOB SERPL: 1.1 G/DL
ALP SERPL-CCNC: 46 U/L (ref 39–117)
ALT SERPL W P-5'-P-CCNC: 18 U/L (ref 1–41)
ANION GAP SERPL CALCULATED.3IONS-SCNC: 11 MMOL/L (ref 5–15)
AST SERPL-CCNC: 26 U/L (ref 1–40)
BACTERIA UR QL AUTO: ABNORMAL /HPF
BASOPHILS # BLD AUTO: 0.02 10*3/MM3 (ref 0–0.2)
BASOPHILS NFR BLD AUTO: 0.2 % (ref 0–1.5)
BILIRUB SERPL-MCNC: 0.6 MG/DL (ref 0–1.2)
BILIRUB UR QL STRIP: NEGATIVE
BUN SERPL-MCNC: 28 MG/DL (ref 8–23)
BUN/CREAT SERPL: 16.5 (ref 7–25)
CALCIUM SPEC-SCNC: 8.9 MG/DL (ref 8.6–10.5)
CHLORIDE SERPL-SCNC: 100 MMOL/L (ref 98–107)
CLARITY UR: ABNORMAL
CO2 SERPL-SCNC: 24 MMOL/L (ref 22–29)
COLOR UR: ABNORMAL
CREAT SERPL-MCNC: 1.7 MG/DL (ref 0.76–1.27)
DEPRECATED RDW RBC AUTO: 47.8 FL (ref 37–54)
EGFRCR SERPLBLD CKD-EPI 2021: 39.5 ML/MIN/1.73
EOSINOPHIL # BLD AUTO: 0.02 10*3/MM3 (ref 0–0.4)
EOSINOPHIL NFR BLD AUTO: 0.2 % (ref 0.3–6.2)
ERYTHROCYTE [DISTWIDTH] IN BLOOD BY AUTOMATED COUNT: 13.2 % (ref 12.3–15.4)
GLOBULIN UR ELPH-MCNC: 3.3 GM/DL
GLUCOSE SERPL-MCNC: 127 MG/DL (ref 65–99)
GLUCOSE UR STRIP-MCNC: NEGATIVE MG/DL
GRAN CASTS URNS QL MICRO: ABNORMAL /LPF
HCT VFR BLD AUTO: 42.2 % (ref 37.5–51)
HGB BLD-MCNC: 14.3 G/DL (ref 13–17.7)
HGB UR QL STRIP.AUTO: ABNORMAL
HOLD SPECIMEN: NORMAL
HYALINE CASTS UR QL AUTO: ABNORMAL /LPF
IMM GRANULOCYTES # BLD AUTO: 0.09 10*3/MM3 (ref 0–0.05)
IMM GRANULOCYTES NFR BLD AUTO: 0.7 % (ref 0–0.5)
KETONES UR QL STRIP: ABNORMAL
LEUKOCYTE ESTERASE UR QL STRIP.AUTO: ABNORMAL
LYMPHOCYTES # BLD AUTO: 0.75 10*3/MM3 (ref 0.7–3.1)
LYMPHOCYTES NFR BLD AUTO: 5.9 % (ref 19.6–45.3)
MCH RBC QN AUTO: 33.5 PG (ref 26.6–33)
MCHC RBC AUTO-ENTMCNC: 33.9 G/DL (ref 31.5–35.7)
MCV RBC AUTO: 98.8 FL (ref 79–97)
MONOCYTES # BLD AUTO: 0.85 10*3/MM3 (ref 0.1–0.9)
MONOCYTES NFR BLD AUTO: 6.7 % (ref 5–12)
NEUTROPHILS NFR BLD AUTO: 11.02 10*3/MM3 (ref 1.7–7)
NEUTROPHILS NFR BLD AUTO: 86.3 % (ref 42.7–76)
NITRITE UR QL STRIP: NEGATIVE
NRBC BLD AUTO-RTO: 0 /100 WBC (ref 0–0.2)
PH UR STRIP.AUTO: 5.5 [PH] (ref 5–8)
PLATELET # BLD AUTO: 217 10*3/MM3 (ref 140–450)
PMV BLD AUTO: 9.9 FL (ref 6–12)
POTASSIUM SERPL-SCNC: 3.8 MMOL/L (ref 3.5–5.2)
PROT SERPL-MCNC: 7 G/DL (ref 6–8.5)
PROT UR QL STRIP: ABNORMAL
RBC # BLD AUTO: 4.27 10*6/MM3 (ref 4.14–5.8)
RBC # UR STRIP: ABNORMAL /HPF
REF LAB TEST METHOD: ABNORMAL
SODIUM SERPL-SCNC: 135 MMOL/L (ref 136–145)
SP GR UR STRIP: 1.02 (ref 1–1.03)
SQUAMOUS #/AREA URNS HPF: ABNORMAL /HPF
UROBILINOGEN UR QL STRIP: ABNORMAL
WBC # UR STRIP: ABNORMAL /HPF
WBC NRBC COR # BLD AUTO: 12.75 10*3/MM3 (ref 3.4–10.8)
WHOLE BLOOD HOLD COAG: NORMAL
WHOLE BLOOD HOLD SPECIMEN: NORMAL

## 2025-02-09 PROCEDURE — 36415 COLL VENOUS BLD VENIPUNCTURE: CPT

## 2025-02-09 PROCEDURE — 87086 URINE CULTURE/COLONY COUNT: CPT | Performed by: PHYSICIAN ASSISTANT

## 2025-02-09 PROCEDURE — 99284 EMERGENCY DEPT VISIT MOD MDM: CPT

## 2025-02-09 PROCEDURE — 81001 URINALYSIS AUTO W/SCOPE: CPT | Performed by: PHYSICIAN ASSISTANT

## 2025-02-09 PROCEDURE — 85025 COMPLETE CBC W/AUTO DIFF WBC: CPT | Performed by: PHYSICIAN ASSISTANT

## 2025-02-09 PROCEDURE — 80053 COMPREHEN METABOLIC PANEL: CPT | Performed by: PHYSICIAN ASSISTANT

## 2025-02-09 PROCEDURE — 93976 VASCULAR STUDY: CPT

## 2025-02-09 PROCEDURE — 76870 US EXAM SCROTUM: CPT

## 2025-02-09 RX ORDER — CEFUROXIME AXETIL 500 MG/1
500 TABLET ORAL 2 TIMES DAILY
Qty: 20 TABLET | Refills: 0 | Status: SHIPPED | OUTPATIENT
Start: 2025-02-09 | End: 2025-02-19

## 2025-02-09 RX ORDER — CEFUROXIME AXETIL 250 MG/1
500 TABLET ORAL ONCE
Status: COMPLETED | OUTPATIENT
Start: 2025-02-09 | End: 2025-02-09

## 2025-02-09 RX ADMIN — CEFUROXIME AXETIL 500 MG: 250 TABLET, FILM COATED ORAL at 19:03

## 2025-02-09 NOTE — ED PROVIDER NOTES
Subjective   History of Present Illness  Pt is a 86 yo male presenting to ED with complaints of left testicle pain. PMHx significant for PAfib, HTN, HLD and Glaucoma. Pt reports left testicle pain and swelling for several days. He has had increased urinary frequency and burning. He denies abdominal pain, N/V/D or flank pain. Denies fever, chills, weakness, CP or SOB. He denies penile discharge or concern for STDs. He thinks he recently took an antibiotic for UTI but can't recall the name. He denies prior testicle surgery. He uses ETOH and denies tobacco use. He is followed by PCP Dr. Manning.     History provided by:  Patient and medical records      Review of Systems   Constitutional:  Negative for fever.   Eyes:  Negative for visual disturbance.   Respiratory:  Negative for cough and shortness of breath.    Cardiovascular:  Negative for chest pain.   Gastrointestinal:  Negative for abdominal pain, diarrhea, nausea and vomiting.   Genitourinary:  Positive for dysuria, frequency, scrotal swelling and testicular pain. Negative for difficulty urinating, flank pain, genital sores, hematuria, penile discharge, penile pain, penile swelling and urgency.   Musculoskeletal:  Negative for back pain.   Neurological:  Negative for dizziness, weakness and headaches.       Past Medical History:   Diagnosis Date    Dyslipidemia     Glaucoma     Hyperlipidemia     Hypertension     Paroxysmal atrial fibrillation        Allergies   Allergen Reactions    Sulfa Antibiotics        Past Surgical History:   Procedure Laterality Date    COLONOSCOPY      10 yr ago    EYE SURGERY      both eyes       Family History   Problem Relation Age of Onset    No Known Problems Mother     Hypertension Father        Social History     Socioeconomic History    Marital status:    Tobacco Use    Smoking status: Never    Smokeless tobacco: Never   Vaping Use    Vaping status: Never Used   Substance and Sexual Activity    Alcohol use: Yes      Alcohol/week: 1.0 - 2.0 standard drink of alcohol     Types: 1 - 2 Glasses of wine per week     Comment: occas    Drug use: No    Sexual activity: Defer           Objective   Physical Exam  Vitals and nursing note reviewed. Exam conducted with a chaperone present.   Constitutional:       General: He is not in acute distress.     Appearance: He is well-developed.   HENT:      Head: Atraumatic.      Nose: Nose normal.   Eyes:      General: Lids are normal.      Conjunctiva/sclera: Conjunctivae normal.      Pupils: Pupils are equal, round, and reactive to light.   Cardiovascular:      Rate and Rhythm: Normal rate and regular rhythm.      Heart sounds: Normal heart sounds.   Pulmonary:      Effort: Pulmonary effort is normal.      Breath sounds: Normal breath sounds.   Abdominal:      General: There is no distension.      Palpations: Abdomen is soft.      Tenderness: There is abdominal tenderness in the suprapubic area. There is no right CVA tenderness, left CVA tenderness, guarding or rebound.   Genitourinary:     Testes:         Right: Tenderness or swelling not present.         Left: Tenderness and swelling present.   Musculoskeletal:         General: No tenderness or deformity. Normal range of motion.      Cervical back: Normal range of motion and neck supple.   Skin:     General: Skin is warm and dry.   Neurological:      Mental Status: He is alert and oriented to person, place, and time.      Sensory: No sensory deficit.   Psychiatric:         Behavior: Behavior normal.         Procedures           ED Course  ED Course as of 02/09/25 1900   Sun Feb 09, 2025   1558 I personally reviewed and interpreted labs results and went over with patient.   I personally reviewed and interpreted vitals.   [RT]   1725 I personally and independently reviewed US testicles and agree with the radiology interpretation specifically left complex hydrocele, small epidymal cysts and no torsion.  [RT]   1821 WBC(!): 12.75 [RT]   1821  Leukocytes, UA(!): Large (3+) [RT]   1821 WBC, UA(!): Too Numerous to Count [RT]   1821 Bacteria, UA: None Seen [RT]   1821 Creatinine(!): 1.70  Prior Cr 1.3 from 2019.  [RT]   1840 Discussed patient with Dr. Chirinos and he is agreeable with ED course and tx plan.  [RT]   1855 Discussed results and tx plan. Will start patient on Ceftin and have him f/u with PCP to recheck renal function / symptoms. Will also place Urology referral. Explained unable to find recent antibiotics prescribed under dispense hx.  [RT]      ED Course User Index  [RT] Shari Ferreira, PA      Recent Results (from the past 24 hours)   Urinalysis With Culture If Indicated - Urine, Clean Catch    Collection Time: 02/09/25  4:16 PM    Specimen: Urine, Clean Catch   Result Value Ref Range    Color, UA Dark Yellow (A) Yellow, Straw    Appearance, UA Turbid (A) Clear    pH, UA 5.5 5.0 - 8.0    Specific Gravity, UA 1.021 1.001 - 1.030    Glucose, UA Negative Negative    Ketones, UA Trace (A) Negative    Bilirubin, UA Negative Negative    Blood, UA Moderate (2+) (A) Negative    Protein,  mg/dL (2+) (A) Negative    Leuk Esterase, UA Large (3+) (A) Negative    Nitrite, UA Negative Negative    Urobilinogen, UA 1.0 E.U./dL 0.2 - 1.0 E.U./dL   Urinalysis, Microscopic Only - Urine, Clean Catch    Collection Time: 02/09/25  4:16 PM    Specimen: Urine, Clean Catch   Result Value Ref Range    RBC, UA 6-10 (A) None Seen, 0-2 /HPF    WBC, UA Too Numerous to Count (A) None Seen, 0-2 /HPF    Bacteria, UA None Seen None Seen, Trace /HPF    Squamous Epithelial Cells, UA 0-2 None Seen, 0-2 /HPF    Hyaline Casts, UA 0-6 0 - 6 /LPF    Granular Casts, UA 0-2 None Seen /LPF    Methodology Manual Light Microscopy    Comprehensive Metabolic Panel    Collection Time: 02/09/25  5:27 PM    Specimen: Blood   Result Value Ref Range    Glucose 127 (H) 65 - 99 mg/dL    BUN 28 (H) 8 - 23 mg/dL    Creatinine 1.70 (H) 0.76 - 1.27 mg/dL    Sodium 135 (L) 136 - 145 mmol/L     Potassium 3.8 3.5 - 5.2 mmol/L    Chloride 100 98 - 107 mmol/L    CO2 24.0 22.0 - 29.0 mmol/L    Calcium 8.9 8.6 - 10.5 mg/dL    Total Protein 7.0 6.0 - 8.5 g/dL    Albumin 3.7 3.5 - 5.2 g/dL    ALT (SGPT) 18 1 - 41 U/L    AST (SGOT) 26 1 - 40 U/L    Alkaline Phosphatase 46 39 - 117 U/L    Total Bilirubin 0.6 0.0 - 1.2 mg/dL    Globulin 3.3 gm/dL    A/G Ratio 1.1 g/dL    BUN/Creatinine Ratio 16.5 7.0 - 25.0    Anion Gap 11.0 5.0 - 15.0 mmol/L    eGFR 39.5 (L) >60.0 mL/min/1.73   CBC Auto Differential    Collection Time: 02/09/25  5:27 PM    Specimen: Blood   Result Value Ref Range    WBC 12.75 (H) 3.40 - 10.80 10*3/mm3    RBC 4.27 4.14 - 5.80 10*6/mm3    Hemoglobin 14.3 13.0 - 17.7 g/dL    Hematocrit 42.2 37.5 - 51.0 %    MCV 98.8 (H) 79.0 - 97.0 fL    MCH 33.5 (H) 26.6 - 33.0 pg    MCHC 33.9 31.5 - 35.7 g/dL    RDW 13.2 12.3 - 15.4 %    RDW-SD 47.8 37.0 - 54.0 fl    MPV 9.9 6.0 - 12.0 fL    Platelets 217 140 - 450 10*3/mm3    Neutrophil % 86.3 (H) 42.7 - 76.0 %    Lymphocyte % 5.9 (L) 19.6 - 45.3 %    Monocyte % 6.7 5.0 - 12.0 %    Eosinophil % 0.2 (L) 0.3 - 6.2 %    Basophil % 0.2 0.0 - 1.5 %    Immature Grans % 0.7 (H) 0.0 - 0.5 %    Neutrophils, Absolute 11.02 (H) 1.70 - 7.00 10*3/mm3    Lymphocytes, Absolute 0.75 0.70 - 3.10 10*3/mm3    Monocytes, Absolute 0.85 0.10 - 0.90 10*3/mm3    Eosinophils, Absolute 0.02 0.00 - 0.40 10*3/mm3    Basophils, Absolute 0.02 0.00 - 0.20 10*3/mm3    Immature Grans, Absolute 0.09 (H) 0.00 - 0.05 10*3/mm3    nRBC 0.0 0.0 - 0.2 /100 WBC   Green Top (Gel)    Collection Time: 02/09/25  5:27 PM   Result Value Ref Range    Extra Tube Hold for add-ons.    Lavender Top    Collection Time: 02/09/25  5:27 PM   Result Value Ref Range    Extra Tube hold for add-on    Gold Top - SST    Collection Time: 02/09/25  5:27 PM   Result Value Ref Range    Extra Tube Hold for add-ons.    Gray Top    Collection Time: 02/09/25  5:27 PM   Result Value Ref Range    Extra Tube Hold for add-ons.    Light  Blue Top    Collection Time: 02/09/25  5:27 PM   Result Value Ref Range    Extra Tube Hold for add-ons.      Note: In addition to lab results from this visit, the labs listed above may include labs taken at another facility or during a different encounter within the last 24 hours. Please correlate lab times with ED admission and discharge times for further clarification of the services performed during this visit.    US Scrotum & Testicles   Final Result   Impression:      1. Normal sized testicles, normal in appearance except for a couple of left-sided tunica albuginea cysts, 7 mm and 3 mm in diameter respectively.      2. Small bilateral epididymal cysts.      3. Small right hydrocele and moderate to large complex left hydrocele.      4. Normal testicular waveform and color Doppler flow, excluding a complete torsion.            Electronically Signed: Gibran Zepeda MD     2/9/2025 5:17 PM EST     Workstation ID: [id number]      US Testicular or Ovarian Vascular Limited   Final Result   Impression:      1. Normal sized testicles, normal in appearance except for a couple of left-sided tunica albuginea cysts, 7 mm and 3 mm in diameter respectively.      2. Small bilateral epididymal cysts.      3. Small right hydrocele and moderate to large complex left hydrocele.      4. Normal testicular waveform and color Doppler flow, excluding a complete torsion.            Electronically Signed: Gibran Zepeda MD     2/9/2025 5:17 PM EST     Workstation ID: [id number]        Vitals:    02/09/25 1557 02/09/25 1726 02/09/25 1730 02/09/25 1800   BP:  140/87 137/86 128/81   BP Location:       Patient Position:       Pulse:   87 92   Resp:       Temp: 97.9 °F (36.6 °C)      TempSrc: Oral      SpO2:   97% 96%   Weight:       Height:         Medications   cefuroxime (CEFTIN) tablet 500 mg (has no administration in time range)     ECG/EMG Results (last 24 hours)       ** No results found for the last 24 hours. **          No orders to display                                                         Medical Decision Making  Pt is a 86 yo male presenting to ED with complaints of left testicle pain and urinary sx. I had a discussion with the patient / family regarding ED course, diagnosis, diagnostic results and treatment plan including medications and admission / discharge. Discussed if new or worse symptoms / concerns to return to ED for further evaluation. Discussed need for close follow up with PCP / specialists.     DDx  Torsion, Cyst, Hydrocele, UTI, Kidney stone, EDUARDO, Sepsis    Problems Addressed:  Acute cystitis without hematuria: complicated acute illness or injury  Left hydrocele: complicated acute illness or injury  Renal insufficiency: complicated acute illness or injury    Amount and/or Complexity of Data Reviewed  External Data Reviewed: notes.     Details: Reviewed previous non ED visits including prior labs, imaging, available notes, medications, allergies and surgical hx.    cards f/u arrhythmia / HTN  Labs: ordered. Decision-making details documented in ED Course.  Radiology: ordered and independent interpretation performed. Decision-making details documented in ED Course.    Risk  Prescription drug management.        Final diagnoses:   Acute cystitis without hematuria   Left hydrocele   Renal insufficiency       ED Disposition  ED Disposition       ED Disposition   Discharge    Condition   Stable    Comment   --               Kai Manning MD  [address] [address]  [phone]    Schedule an appointment as soon as possible for a visit       Adis Samuel MD  [address] [address]  [phone]    Schedule an appointment as soon as possible for a visit       [facility] EMERGENCY DEPARTMENT  [address] [facility] [id number] [phone]    If symptoms worsen         Medication List        New Prescriptions      cefuroxime 500 MG  tablet  Commonly known as: CEFTIN  Take 1 tablet by mouth 2 (Two) Times a Day for 10 days.               Where to Get Your Medications        These medications were sent to [facility] [id number] - [facility] [address] [facility] - [phone]  - [phone]   [address] [place] [address]      Phone: [phone]   cefuroxime 500 MG tablet            Shari Ferreira PA  02/09/25 0854

## 2025-02-11 LAB — BACTERIA SPEC AEROBE CULT: NO GROWTH
